# Patient Record
Sex: FEMALE | Race: WHITE | Employment: UNEMPLOYED | URBAN - METROPOLITAN AREA
[De-identification: names, ages, dates, MRNs, and addresses within clinical notes are randomized per-mention and may not be internally consistent; named-entity substitution may affect disease eponyms.]

---

## 2018-08-23 ENCOUNTER — APPOINTMENT (OUTPATIENT)
Dept: GENERAL RADIOLOGY | Age: 18
End: 2018-08-23
Attending: EMERGENCY MEDICINE
Payer: SUBSIDIZED

## 2018-08-23 ENCOUNTER — HOSPITAL ENCOUNTER (OUTPATIENT)
Age: 18
Setting detail: OBSERVATION
Discharge: HOME | End: 2018-08-24
Attending: EMERGENCY MEDICINE | Admitting: EMERGENCY MEDICINE
Payer: SUBSIDIZED

## 2018-08-23 DIAGNOSIS — J45.21 MILD INTERMITTENT ASTHMA WITH ACUTE EXACERBATION: Primary | ICD-10-CM

## 2018-08-23 PROBLEM — J45.901 ASTHMA EXACERBATION: Status: ACTIVE | Noted: 2018-08-23

## 2018-08-23 PROCEDURE — 96365 THER/PROPH/DIAG IV INF INIT: CPT

## 2018-08-23 PROCEDURE — 74011000250 HC RX REV CODE- 250: Performed by: EMERGENCY MEDICINE

## 2018-08-23 PROCEDURE — 74011250636 HC RX REV CODE- 250/636: Performed by: EMERGENCY MEDICINE

## 2018-08-23 PROCEDURE — 94640 AIRWAY INHALATION TREATMENT: CPT

## 2018-08-23 PROCEDURE — 99218 HC RM OBSERVATION: CPT

## 2018-08-23 PROCEDURE — 96375 TX/PRO/DX INJ NEW DRUG ADDON: CPT

## 2018-08-23 PROCEDURE — 71045 X-RAY EXAM CHEST 1 VIEW: CPT

## 2018-08-23 PROCEDURE — 77030029684 HC NEB SM VOL KT MONA -A

## 2018-08-23 PROCEDURE — 96366 THER/PROPH/DIAG IV INF ADDON: CPT

## 2018-08-23 PROCEDURE — 96361 HYDRATE IV INFUSION ADD-ON: CPT

## 2018-08-23 PROCEDURE — 99285 EMERGENCY DEPT VISIT HI MDM: CPT

## 2018-08-23 PROCEDURE — 74011250637 HC RX REV CODE- 250/637: Performed by: EMERGENCY MEDICINE

## 2018-08-23 RX ORDER — ALBUTEROL SULFATE 90 UG/1
2 AEROSOL, METERED RESPIRATORY (INHALATION)
Status: COMPLETED | OUTPATIENT
Start: 2018-08-23 | End: 2018-08-23

## 2018-08-23 RX ORDER — IPRATROPIUM BROMIDE AND ALBUTEROL SULFATE 2.5; .5 MG/3ML; MG/3ML
3 SOLUTION RESPIRATORY (INHALATION)
Status: DISCONTINUED | OUTPATIENT
Start: 2018-08-24 | End: 2018-08-24 | Stop reason: HOSPADM

## 2018-08-23 RX ORDER — SODIUM CHLORIDE 0.9 % (FLUSH) 0.9 %
5-10 SYRINGE (ML) INJECTION EVERY 8 HOURS
Status: DISCONTINUED | OUTPATIENT
Start: 2018-08-23 | End: 2018-08-24 | Stop reason: HOSPADM

## 2018-08-23 RX ORDER — IPRATROPIUM BROMIDE AND ALBUTEROL SULFATE 2.5; .5 MG/3ML; MG/3ML
3 SOLUTION RESPIRATORY (INHALATION)
Status: DISPENSED | OUTPATIENT
Start: 2018-08-23 | End: 2018-08-24

## 2018-08-23 RX ORDER — MAGNESIUM SULFATE HEPTAHYDRATE 40 MG/ML
2 INJECTION, SOLUTION INTRAVENOUS
Status: COMPLETED | OUTPATIENT
Start: 2018-08-23 | End: 2018-08-23

## 2018-08-23 RX ORDER — IPRATROPIUM BROMIDE AND ALBUTEROL SULFATE 2.5; .5 MG/3ML; MG/3ML
3 SOLUTION RESPIRATORY (INHALATION) ONCE
Status: COMPLETED | OUTPATIENT
Start: 2018-08-23 | End: 2018-08-23

## 2018-08-23 RX ORDER — SODIUM CHLORIDE 0.9 % (FLUSH) 0.9 %
10 SYRINGE (ML) INJECTION AS NEEDED
Status: DISCONTINUED | OUTPATIENT
Start: 2018-08-23 | End: 2018-08-24 | Stop reason: HOSPADM

## 2018-08-23 RX ORDER — IPRATROPIUM BROMIDE AND ALBUTEROL SULFATE 2.5; .5 MG/3ML; MG/3ML
3 SOLUTION RESPIRATORY (INHALATION)
Status: COMPLETED | OUTPATIENT
Start: 2018-08-23 | End: 2018-08-23

## 2018-08-23 RX ORDER — SODIUM CHLORIDE 0.9 % (FLUSH) 0.9 %
5-10 SYRINGE (ML) INJECTION AS NEEDED
Status: DISCONTINUED | OUTPATIENT
Start: 2018-08-23 | End: 2018-08-24 | Stop reason: SDUPTHER

## 2018-08-23 RX ORDER — HYDROCORTISONE SODIUM SUCCINATE 100 MG/2ML
INJECTION, POWDER, FOR SOLUTION INTRAMUSCULAR; INTRAVENOUS
Status: DISPENSED
Start: 2018-08-23 | End: 2018-08-24

## 2018-08-23 RX ORDER — PREDNISONE 20 MG/1
60 TABLET ORAL
Status: DISCONTINUED | OUTPATIENT
Start: 2018-08-24 | End: 2018-08-24

## 2018-08-23 RX ADMIN — MAGNESIUM SULFATE IN WATER 2 G: 40 INJECTION, SOLUTION INTRAVENOUS at 18:21

## 2018-08-23 RX ADMIN — IPRATROPIUM BROMIDE AND ALBUTEROL SULFATE 3 ML: .5; 3 SOLUTION RESPIRATORY (INHALATION) at 15:45

## 2018-08-23 RX ADMIN — IPRATROPIUM BROMIDE AND ALBUTEROL SULFATE 3 ML: .5; 3 SOLUTION RESPIRATORY (INHALATION) at 15:32

## 2018-08-23 RX ADMIN — IPRATROPIUM BROMIDE AND ALBUTEROL SULFATE 3 ML: .5; 3 SOLUTION RESPIRATORY (INHALATION) at 16:43

## 2018-08-23 RX ADMIN — METHYLPREDNISOLONE SODIUM SUCCINATE 125 MG: 125 INJECTION, POWDER, FOR SOLUTION INTRAMUSCULAR; INTRAVENOUS at 20:57

## 2018-08-23 RX ADMIN — ALBUTEROL SULFATE 2 PUFF: 90 AEROSOL, METERED RESPIRATORY (INHALATION) at 16:41

## 2018-08-23 RX ADMIN — SODIUM CHLORIDE 1000 ML: 900 INJECTION, SOLUTION INTRAVENOUS at 18:21

## 2018-08-23 RX ADMIN — IPRATROPIUM BROMIDE AND ALBUTEROL SULFATE 3 ML: .5; 3 SOLUTION RESPIRATORY (INHALATION) at 19:39

## 2018-08-23 NOTE — ED NOTES
Patient presents to the ED with c/o wheezing x2 days. Pt reports using a nebulizer at an urgent care and then took another one at home but still is wheezing. Pt reports the urgent care gave her prednisone. Pt reports taking mucinex. Pt reports chronic leg pain. Pt is alert and oriented. Pt skin is warm and dry. Pt is ambulatory independently. Emergency Department Nursing Plan of Care       The Nursing Plan of Care is developed from the Nursing assessment and Emergency Department Attending provider initial evaluation. The plan of care may be reviewed in the ED Provider note.     The Plan of Care was developed with the following considerations:   Patient / Family readiness to learn indicated by:verbalized understanding  Persons(s) to be included in education: patient  Barriers to Learning/Limitations:No    Signed     Jamal Coleman    8/23/2018   3:27 PM

## 2018-08-23 NOTE — ED NOTES
Bedside and Verbal shift change report given to MARLA Bello (oncoming nurse) by Neo Ponce RN (offgoing nurse). Report included the following information SBAR, ED Summary, MAR and Recent Results.

## 2018-08-23 NOTE — IP AVS SNAPSHOT
Summary of Care Report The Summary of Care report has been created to help improve care coordination. Users with access to [employer] or [address] (Web-based application) may access additional patient information including the Discharge Summary. If you are not currently a [address] user and need more information, please call the number listed below in the [place] [address] section and ask to be connected with Medical Records. Facility Information Name Address Phone [address] [place] [address] [phone] Patient Information Patient Name Sex  Leatha Campos ([id number]) Female 2000 Discharge Information Admitting Provider Service Area Unit Alie Corea MD / [address] [facility] / [phone] Discharge Provider Discharge Date/Time Discharge Disposition Destination (none) 2018 (Pending) AHR (none) Patient Language Language ENGLISH [13] Hospital Problems as of 2018  Reviewed: 2018 10:45 PM by Alie Corea MD  
  
  
  
 Class Noted - Resolved Last Modified POA Active Problems * (Principal)Asthma exacerbation  2018 - Present 2018 by Alie Corea MD Unknown Entered by Alie Corea MD  
  
Non-Hospital Problems as of 2018  Reviewed: 2018 10:45 PM by Alie Corea MD  
 None You are allergic to the following Allergen Reactions Sulfa (Sulfonamide Antibiotics) Hives Animal Dander Itching Sneezing Grass Pollen Itching Sneezing Nut - Unspecified Nausea and Vomiting Swelling All nuts except almonds Current Discharge Medication List  
  
START taking these medications Dose & Instructions Dispensing Information Comments  
 azithromycin 500 mg Tab Commonly known as:  Charlotta Primrose Dose:  500 mg Take 1 Tab by mouth daily for 2 days. Quantity:  2 Tab Refills:  0  
   
 montelukast 10 mg tablet Commonly known as:  SINGULAIR Dose:  10 mg Take 1 Tab by mouth nightly. Quantity:  30 Tab Refills:  0 CONTINUE these medications which have NOT CHANGED Dose & Instructions Dispensing Information Comments CLARITIN 10 mg tablet Generic drug:  loratadine Dose:  10 mg Take 10 mg by mouth daily. Refills:  0  
   
 diphenhydrAMINE 25 mg tablet Commonly known as:  BENADRYL Dose:  12.5-50 mg Take 12.5-50 mg by mouth nightly as needed for Sleep (allergies). Refills:  0  
   
 metroNIDAZOLE 500 mg tablet Commonly known as:  FLAGYL Dose:  500 mg Take 500 mg by mouth every twelve (12) hours. For 7 days starting 8/20/18 Refills:  0  
   
 predniSONE 10 mg dose pack Commonly known as:  STERAPRED DS Take  by mouth See Admin Instructions. See administration instruction per 10mg dose pack Refills:  0  
   
 SYMBICORT IN Dose:  1 Puff Take 1 Puff by inhalation two (2) times a day. Refills:  0  
   
 * VENTOLIN HFA 90 mcg/actuation inhaler Generic drug:  albuterol Dose:  2 Puff Take 2 Puffs by inhalation every four (4) hours as needed for Wheezing or Shortness of Breath. Indications: Acute Asthma Attack Refills:  0  
   
 * albuterol 2.5 mg /3 mL (0.083 %) nebulizer solution Commonly known as:  PROVENTIL VENTOLIN Dose:  2.5 mg  
2.5 mg by Nebulization route every four (4) hours as needed for Wheezing or Shortness of Breath (if no relief from albuterol inhaler). Indications: Acute Asthma Attack Refills:  0  
   
 * Notice: This list has 2 medication(s) that are the same as other medications prescribed for you. Read the directions carefully, and ask your doctor or other care provider to review them with you. Follow-up Information Follow up With Details Comments Contact Info Your PCP in [address] an appointment as soon as possible for a visit [facility] Schedule an appointment as soon as possible for a visit As needed if you need a local PCP [address] [address] [phone] [facility] EMERGENCY DEPT  As needed, If symptoms worsen [address] [facility],[phone] [facility] None   None (395) Patient stated that they have no PCP Discharge Instructions Learning About Asthma Triggers What are asthma triggers? When you have asthma, certain things can make your symptoms worse. These are called triggers. Learn what triggers an asthma attack for you, and avoid the triggers when you can. Common triggers include colds, smoke, air pollution, dust, pollen, pets, stress, and cold air. How do asthma triggers affect you? Triggers can make it harder for your lungs to work as they should. They can lead to sudden breathing problems and other symptoms. When you are around a trigger, an asthma attack is more likely. If your symptoms are severe, you may need emergency treatment or have to go to the hospital for treatment. What can you do to avoid triggers? The first thing is to know your triggers. When you are having symptoms, note the things around you that might be causing them. Then look for patterns that may be triggering your symptoms. Record your triggers on a piece of paper or in an asthma diary. When you have your list of possible triggers, work with your doctor to find ways to avoid them. Avoid colds and flu. Get a pneumococcal vaccine shot. If you have had one before, ask your doctor whether you need a second dose. Get a flu vaccine every year, as soon as it's available. If you must be around people with colds or the flu, wash your hands often. Here are some ways to avoid a few common triggers. · Do not smoke or allow others to smoke around you. If you need help quitting, talk to your doctor about stop-smoking programs and medicines. These can increase your chances of quitting for good. · If there is a lot of pollution, pollen, or dust outside, stay at home and keep your windows closed. Use an air conditioner or air filter in your home. Check your local weather report or newspaper for air quality and pollen reports. What else should you know? · Take your controller medicine every day, not just when you have symptoms. It helps prevent problems before they occur. · Your doctor may suggest that you check how well your lungs are working by measuring your peak expiratory flow (PEF) throughout the day. Your PEF may drop when you are near things that trigger symptoms. Where can you learn more? Go to http://arnavShopSpotmay.info/. Enter Q228 in the search box to learn more about \"Learning About Asthma Triggers. \" Current as of: December 6, 2017 Content Version: 11.7 © 3979-0646 [employer]. Care instructions adapted under license by [employer] (which disclaims liability or warranty for this information). If you have questions about a medical condition or this instruction, always ask your healthcare professional. [place] [address] any warranty or liability for your use of this information. Asthma in Adults: Care Instructions Your Care Instructions During an asthma attack, your airways swell and narrow as a reaction to certain things (triggers). This makes it hard to breathe. You may be able to prevent asthma attacks if you avoid the things that set off your asthma symptoms. Keeping your asthma under control and treating symptoms before they get bad can help you avoid severe attacks. If you can control your asthma, you may be able to do all of your normal daily activities.  You may also avoid asthma attacks and trips to the hospital. 
 Follow-up care is a key part of your treatment and safety. Be sure to make and go to all appointments, and call your doctor if you are having problems. It's also a good idea to know your test results and keep a list of the medicines you take. How can you care for yourself at home? · Follow your asthma action plan so you can manage your symptoms at home. An asthma action plan will help you prevent and control airway reactions and will tell you what to do during an asthma attack. If you do not have an asthma action plan, work with your doctor to build one. · Take your asthma medicine exactly as prescribed. Medicine plays an important role in controlling asthma. Talk to your doctor right away if you have any questions about what to take and how to take it. ¨ Use your quick-relief medicine when you have symptoms of an attack. Quick-relief medicine often is an albuterol inhaler. Some people need to use quick-relief medicine before they exercise. ¨ Take your controller medicine every day, not just when you have symptoms. Controller medicine is usually an inhaled corticosteroid. The goal is to prevent problems before they occur. Do not use your controller medicine to try to treat an attack that has already started. It does not work fast enough to help. ¨ If your doctor prescribed corticosteroid pills to use during an attack, take them as directed. They may take hours to work, but they may shorten the attack and help you breathe better. ¨ Keep your quick-relief medicine with you at all times. · Talk to your doctor before using other medicines. Some medicines, such as aspirin, can cause asthma attacks in some people. · Check yourself for asthma symptoms to know which step to follow in your action plan. Watch for things like being short of breath, having chest tightness, coughing, and wheezing. Also notice if symptoms wake you up at night or if you get tired quickly when you exercise. · If you have a peak flow meter, use it to check how well you are breathing. This can help you predict when an asthma attack is going to occur. Then you can take medicine to prevent the asthma attack or make it less severe. · See your doctor regularly. These visits will help you learn more about asthma and what you can do to control it. Your doctor will monitor your treatment to make sure the medicine is helping you. · Keep track of your asthma attacks and your treatment. After you have had an attack, write down what triggered it, what helped end it, and any concerns you have about your asthma action plan. Take your diary when you see your doctor. You can then review your asthma action plan and decide if it is working. · Do not smoke or allow others to smoke around you. Avoid smoky places. Smoking makes asthma worse. If you need help quitting, talk to your doctor about stop-smoking programs and medicines. These can increase your chances of quitting for good. · Learn what triggers an asthma attack for you, and avoid the triggers when you can. Common triggers include colds, smoke, air pollution, dust, pollen, mold, pets, cockroaches, stress, and cold air. · Avoid colds and the flu. Get a pneumococcal vaccine shot. If you have had one before, ask your doctor whether you need a second dose. Get a flu vaccine every fall. If you must be around people with colds or the flu, wash your hands often. When should you call for help? Call 911 anytime you think you may need emergency care. For example, call if: 
  · You have severe trouble breathing.  
 Call your doctor now or seek immediate medical care if: 
  · Your symptoms do not get better after you have followed your asthma action plan.  
  · You cough up yellow, dark brown, or bloody mucus (sputum).  
 Watch closely for changes in your health, and be sure to contact your doctor if: 
  · Your coughing and wheezing get worse.   · You need to use quick-relief medicine on more than 2 days a week (unless it is just for exercise).  
  · You need help figuring out what is triggering your asthma attacks. Where can you learn more? Go to http://arnav-may.info/. Enter P597 in the search box to learn more about \"Asthma in Adults: Care Instructions. \" Current as of: December 6, 2017 Content Version: 11.7 © 7035-3379 [employer]. Care instructions adapted under license by [employer] (which disclaims liability or warranty for this information). If you have questions about a medical condition or this instruction, always ask your healthcare professional. [place] [address] any warranty or liability for your use of this information. Chart Review Routing History No Routing History on File

## 2018-08-23 NOTE — ED NOTES
Patient reports feeling better, but when she walks to the bathroom she is still wheezing and feels out of breath.

## 2018-08-23 NOTE — IP AVS SNAPSHOT
[address] [facility] 
 
 
 [address] Patient: Jacqui Allison MRN: [id number] FPN:[id number] About your hospitalization You were admitted on:  August 23, 2018 You last received care in the:  [address] You were discharged on:  August 24, 2018 Why you were hospitalized Your primary diagnosis was:  Asthma Exacerbation Follow-up Information Follow up With Details Comments Contact Info Your PCP in [address] an appointment as soon as possible for a visit [facility] Schedule an appointment as soon as possible for a visit As needed if you need a local PCP [address] [facility] [address] [phone] [facility] EMERGENCY DEPT  As needed, If symptoms worsen [address] [facility], [address] None   None ([phone]) Patient stated that they have no PCP Discharge Orders None A check deonte indicates which time of day the medication should be taken. My Medications START taking these medications Instructions Each Dose to Equal  
 Morning Noon Evening Bedtime  
 azithromycin 500 mg Tab Commonly known as:  Bina Ax Your last dose was: Your next dose is: Take 1 Tab by mouth daily for 2 days. 500 mg  
    
   
   
   
  
 montelukast 10 mg tablet Commonly known as:  SINGULAIR Your last dose was: Your next dose is: Take 1 Tab by mouth nightly. 10 mg CONTINUE taking these medications Instructions Each Dose to Equal  
 Morning Noon Evening Bedtime CLARITIN 10 mg tablet Generic drug:  loratadine Your last dose was: Your next dose is: Take 10 mg by mouth daily. 10 mg  
    
   
   
   
  
 diphenhydrAMINE 25 mg tablet Commonly known as:  BENADRYL Your last dose was: Your next dose is: Take 12.5-50 mg by mouth nightly as needed for Sleep (allergies). 12.5-50 mg  
    
   
   
   
  
 metroNIDAZOLE 500 mg tablet Commonly known as:  FLAGYL Your last dose was: Your next dose is: Take 500 mg by mouth every twelve (12) hours. For 7 days starting 8/20/18  
 500 mg  
    
   
   
   
  
 predniSONE 10 mg dose pack Commonly known as:  STERAPRED DS Your last dose was: Your next dose is: Take  by mouth See Admin Instructions. See administration instruction per 10mg dose pack SYMBICORT IN Your last dose was: Your next dose is: Take 1 Puff by inhalation two (2) times a day. 1 Puff * VENTOLIN HFA 90 mcg/actuation inhaler Generic drug:  albuterol Your last dose was: Your next dose is: Take 2 Puffs by inhalation every four (4) hours as needed for Wheezing or Shortness of Breath. Indications: Acute Asthma Attack 2 Puff * albuterol 2.5 mg /3 mL (0.083 %) nebulizer solution Commonly known as:  PROVENTIL VENTOLIN Your last dose was: Your next dose is:    
   
   
 2.5 mg by Nebulization route every four (4) hours as needed for Wheezing or Shortness of Breath (if no relief from albuterol inhaler). Indications: Acute Asthma Attack 2.5 mg  
    
   
   
   
  
 * Notice: This list has 2 medication(s) that are the same as other medications prescribed for you. Read the directions carefully, and ask your doctor or other care provider to review them with you. Where to Get Your Medications These medications were sent to [email or web address] [employer], [address]  [address] [place] [address], [address] [place] [address] Phone:  [phone]  
  azithromycin 500 mg Tab  
 montelukast 10 mg tablet Discharge Instructions Learning About Asthma Triggers What are asthma triggers? When you have asthma, certain things can make your symptoms worse. These are called triggers. Learn what triggers an asthma attack for you, and avoid the triggers when you can. Common triggers include colds, smoke, air pollution, dust, pollen, pets, stress, and cold air. How do asthma triggers affect you? Triggers can make it harder for your lungs to work as they should. They can lead to sudden breathing problems and other symptoms. When you are around a trigger, an asthma attack is more likely. If your symptoms are severe, you may need emergency treatment or have to go to the hospital for treatment. What can you do to avoid triggers? The first thing is to know your triggers. When you are having symptoms, note the things around you that might be causing them. Then look for patterns that may be triggering your symptoms. Record your triggers on a piece of paper or in an asthma diary. When you have your list of possible triggers, work with your doctor to find ways to avoid them. Avoid colds and flu. Get a pneumococcal vaccine shot. If you have had one before, ask your doctor whether you need a second dose. Get a flu vaccine every year, as soon as it's available. If you must be around people with colds or the flu, wash your hands often. Here are some ways to avoid a few common triggers. · Do not smoke or allow others to smoke around you. If you need help quitting, talk to your doctor about stop-smoking programs and medicines. These can increase your chances of quitting for good. · If there is a lot of pollution, pollen, or dust outside, stay at home and keep your windows closed. Use an air conditioner or air filter in your home. Check your local weather report or newspaper for air quality and pollen reports. What else should you know? · Take your controller medicine every day, not just when you have symptoms. It helps prevent problems before they occur. · Your doctor may suggest that you check how well your lungs are working by measuring your peak expiratory flow (PEF) throughout the day. Your PEF may drop when you are near things that trigger symptoms. Where can you learn more? Go to http://arnav-may.info/. Enter O070 in the search box to learn more about \"Learning About Asthma Triggers. \" Current as of: December 6, 2017 Content Version: 11.7 © 0491-5547 [email or web address]. Care instructions adapted under license by [employer] (which disclaims liability or warranty for this information). If you have questions about a medical condition or this instruction, always ask your healthcare professional. [address] any warranty or liability for your use of this information. Asthma in Adults: Care Instructions Your Care Instructions During an asthma attack, your airways swell and narrow as a reaction to certain things (triggers). This makes it hard to breathe. You may be able to prevent asthma attacks if you avoid the things that set off your asthma symptoms. Keeping your asthma under control and treating symptoms before they get bad can help you avoid severe attacks. If you can control your asthma, you may be able to do all of your normal daily activities. You may also avoid asthma attacks and trips to the hospital. 
Follow-up care is a key part of your treatment and safety. Be sure to make and go to all appointments, and call your doctor if you are having problems. It's also a good idea to know your test results and keep a list of the medicines you take. How can you care for yourself at home? · Follow your asthma action plan so you can manage your symptoms at home. An asthma action plan will help you prevent and control airway reactions and will tell you what to do during an asthma attack. If you do not have an asthma action plan, work with your doctor to build one. · Take your asthma medicine exactly as prescribed. Medicine plays an important role in controlling asthma. Talk to your doctor right away if you have any questions about what to take and how to take it. ¨ Use your quick-relief medicine when you have symptoms of an attack. Quick-relief medicine often is an albuterol inhaler. Some people need to use quick-relief medicine before they exercise. ¨ Take your controller medicine every day, not just when you have symptoms. Controller medicine is usually an inhaled corticosteroid. The goal is to prevent problems before they occur. Do not use your controller medicine to try to treat an attack that has already started. It does not work fast enough to help. ¨ If your doctor prescribed corticosteroid pills to use during an attack, take them as directed. They may take hours to work, but they may shorten the attack and help you breathe better. ¨ Keep your quick-relief medicine with you at all times. · Talk to your doctor before using other medicines. Some medicines, such as aspirin, can cause asthma attacks in some people. · Check yourself for asthma symptoms to know which step to follow in your action plan. Watch for things like being short of breath, having chest tightness, coughing, and wheezing. Also notice if symptoms wake you up at night or if you get tired quickly when you exercise. · If you have a peak flow meter, use it to check how well you are breathing. This can help you predict when an asthma attack is going to occur. Then you can take medicine to prevent the asthma attack or make it less severe. · See your doctor regularly. These visits will help you learn more about asthma and what you can do to control it. Your doctor will monitor your treatment to make sure the medicine is helping you. · Keep track of your asthma attacks and your treatment.  After you have had an attack, write down what triggered it, what helped end it, and any concerns you have about your asthma action plan. Take your diary when you see your doctor. You can then review your asthma action plan and decide if it is working. · Do not smoke or allow others to smoke around you. Avoid smoky places. Smoking makes asthma worse. If you need help quitting, talk to your doctor about stop-smoking programs and medicines. These can increase your chances of quitting for good. · Learn what triggers an asthma attack for you, and avoid the triggers when you can. Common triggers include colds, smoke, air pollution, dust, pollen, mold, pets, cockroaches, stress, and cold air. · Avoid colds and the flu. Get a pneumococcal vaccine shot. If you have had one before, ask your doctor whether you need a second dose. Get a flu vaccine every fall. If you must be around people with colds or the flu, wash your hands often. When should you call for help? Call 911 anytime you think you may need emergency care. For example, call if: 
  · You have severe trouble breathing.  
 Call your doctor now or seek immediate medical care if: 
  · Your symptoms do not get better after you have followed your asthma action plan.  
  · You cough up yellow, dark brown, or bloody mucus (sputum).  
 Watch closely for changes in your health, and be sure to contact your doctor if: 
  · Your coughing and wheezing get worse.  
  · You need to use quick-relief medicine on more than 2 days a week (unless it is just for exercise).  
  · You need help figuring out what is triggering your asthma attacks. Where can you learn more? Go to http://arnav-may.info/. Enter P597 in the search box to learn more about \"Asthma in Adults: Care Instructions. \" Current as of: December 6, 2017 Content Version: 11.7 © 1783-5579 [employer].  Care instructions adapted under license by [employer] (which disclaims liability or warranty for this information). If you have questions about a medical condition or this instruction, always ask your healthcare professional. [address] any warranty or liability for your use of this information. Introducing [facility] SERVICES! Mackelin Jonesrobin introduces MycooN patient portal. Now you can access parts of your medical record, email your doctor's office, and request medication refills online. 1. In your internet browser, go to [email or web address] 2. Click on the First Time User? Click Here link in the Sign In box. You will see the New Member Sign Up page. 3. Enter your MycooN Access Code exactly as it appears below. You will not need to use this code after youve completed the sign-up process. If you do not sign up before the expiration date, you must request a new code. · MycooN Access Code: [email or web address] Expires: 11/21/2018  3:13 PM 
 
4. Enter the last four digits of your Social Security Number (xxxx) and Date of Birth (mm/dd/yyyy) as indicated and click Submit. You will be taken to the next sign-up page. 5. Create a MycooN ID. This will be your MycooN login ID and cannot be changed, so think of one that is secure and easy to remember. 6. Create a MycooN password. You can change your password at any time. 7. Enter your Password Reset Question and Answer. This can be used at a later time if you forget your password. 8. Enter your e-mail address. You will receive e-mail notification when new information is available in [address] 9. Click Sign Up. You can now view and download portions of your medical record. 10. Click the Download Summary menu link to download a portable copy of your medical information. If you have questions, please visit the Frequently Asked Questions section of the MycooN website. Remember, MycooN is NOT to be used for urgent needs. For medical emergencies, dial 911. Now available from your iPhone and Android! Introducing Darien Jones As a [employer] patient, I wanted to make you aware of our electronic visit tool called Darien Jones. [employer] 24/7 allows you to connect within minutes with a medical provider 24 hours a day, seven days a week via a mobile device or tablet or logging into a secure website from your computer. You can access Darien oJnes from anywhere in the [place]. A virtual visit might be right for you when you have a simple condition and feel like you just dont want to get out of bed, or cant get away from work for an appointment, when your regular [employer] provider is not available (evenings, weekends or holidays), or when youre out of town and need minor care. Electronic visits cost only $49 and if the [employer] 24/7 provider determines a prescription is needed to treat your condition, one can be electronically transmitted to a nearby pharmacy*. Please take a moment to enroll today if you have not already done so. The enrollment process is free and takes just a few minutes. To enroll, please download the [employer] 24/7 anish to your tablet or phone, or visit [email or web address] to enroll on your computer. And, as an [address] [place] [address] patient with a [facility] account, the results of your visits will be scanned into your electronic medical record and your primary care provider will be able to view the scanned results. We urge you to continue to see your regular [employer] provider for your ongoing medical care. And while your primary care provider may not be the one available when you seek a Darien Jones virtual visit, the peace of mind you get from getting a real diagnosis real time can be priceless. For more information on Darien Jones, view our Frequently Asked Questions (FAQs) at [email or web address]. Sincerely, 
 
Pal Ruiz MD 
Chief Medical Officer [employer] *:  certain medications cannot be prescribed via Darien Jones Providers Seen During Your Hospitalization Provider Specialty Primary office phone Jordon Arevalo MD Emergency Medicine [phone] Nima Rios MD Emergency Medicine [phone] Sergio Frankel MD Internal Medicine [phone] Your Primary Care Physician (PCP) Primary Care Physician Office Phone Office Fax NONE ** None ** ** None ** You are allergic to the following Allergen Reactions Sulfa (Sulfonamide Antibiotics) Hives Animal Dander Itching Sneezing Grass Pollen Itching Sneezing Nut - Unspecified Nausea and Vomiting Swelling All nuts except almonds Recent Documentation Height Weight BMI Smoking Status 1.473 m (<1 %, Z= -2.46)* 52.1 kg (24 %, Z= -0.72)* 23.99 kg/m2 (72 %, Z= 0.59)* Never Smoker *Growth percentiles are based on [facility] 2-20 Years data. Emergency Contacts Name Discharge Info Relation Home Work Mobile Gracia Carrion [1] Mother [14] [phone] Patient Belongings The following personal items are in your possession at time of discharge: 
  Dental Appliances: Retainer(s)  Visual Aid: None      Home Medications: Kept at bedside   Jewelry: Earrings, With patient  Clothing: Pants, Shirt, Socks    Other Valuables: None Please provide this summary of care documentation to your next provider. Signatures-by signing, you are acknowledging that this After Visit Summary has been reviewed with you and you have received a copy. Patient Signature:  ____________________________________________________________ Date:  ____________________________________________________________  
  
Angela Li Provider Signature:  ____________________________________________________________ Date:  ____________________________________________________________

## 2018-08-23 NOTE — ED PROVIDER NOTES
EMERGENCY DEPARTMENT HISTORY AND PHYSICAL EXAM      Date: 8/23/2018  Patient Name: Deshawn Strong    History of Presenting Illness     Chief Complaint   Patient presents with    Wheezing     pt c/o sob,asthma seen at urgent care with same had neb tx and 60 mg prednisone x 4 hours ago. History Provided By: Patient    HPI: Deshawn Strong, 21 y.o. female with PMHx significant for asthma, presents ambulatory to the ED with cc of persistent SOB and wheezing x 2 days. Pt also reports cough, chills, and headache. She states she has done numerous nebulizer treatments (over 12) at home over the past 18 hours with no relief of sx's. Pt was seen at Urgent Care where she received another treatment and 60mg prednisone, however was told that if she could not wait another 4 hours for a breathing treatment to go to the ED. She explains upon returning home she did a breathing treatment again with no relief, prompting her ED visit. Pt reports current sx's are c/w hx of asthma, however notes she typically responds more promptly to prednisone and breathing treatments. Pt is not currently on OCP's and denies tobacco use. Of note, pt states she is currently taking Flagyl for BV. She explains she has plenty of Albuterol at home and does not need a refill. She is currently on her menstrual cycle and denies chance of pregnancy. Pt specifically denies any fever, leg swelling, or calf pain. There are no other complaints, changes, or physical findings at this time.     PCP: None    Current Facility-Administered Medications   Medication Dose Route Frequency Provider Last Rate Last Dose    albuterol-ipratropium (DUO-NEB) 2.5 MG-0.5 MG/3 ML  3 mL Nebulization NOW Quinten Corbett MD        sodium chloride (NS) flush 10 mL  10 mL IntraVENous PRN Quinten Corbett MD        hydrocortisone Sod Succ (PF) (SOLU-CORTEF) 100 mg/2 mL injection             sodium chloride (NS) flush 5-10 mL  5-10 mL IntraVENous Q8H Jacobo Fraser MD        sodium chloride (NS) flush 5-10 mL  5-10 mL IntraVENous PRN Dennis Clark MD        [START ON 8/24/2018] predniSONE (DELTASONE) tablet 60 mg  60 mg Oral DAILY WITH BREAKFAST Dennis Clark MD        [START ON 8/24/2018] albuterol-ipratropium (DUO-NEB) 2.5 MG-0.5 MG/3 ML  3 mL Nebulization Q4H RT Dennis Clark MD         Current Outpatient Prescriptions   Medication Sig Dispense Refill    budesonide/formoterol fumarate (SYMBICORT IN) Take  by inhalation.  loratadine (CLARITIN PO) Take  by mouth. Past History     Past Medical History:  History reviewed. No pertinent past medical history. Past Surgical History:  Past Surgical History:   Procedure Laterality Date    HX WISDOM TEETH EXTRACTION         Family History:  History reviewed. No pertinent family history. Social History:  Social History   Substance Use Topics    Smoking status: Never Smoker    Smokeless tobacco: Never Used    Alcohol use No       Allergies: Allergies   Allergen Reactions    Sulfa (Sulfonamide Antibiotics) Hives         Review of Systems   Review of Systems   Constitutional: Positive for chills. Negative for fever. HENT: Negative for congestion, rhinorrhea, sneezing and sore throat. Respiratory: Positive for cough, shortness of breath and wheezing. Cardiovascular: Negative for chest pain and leg swelling. Gastrointestinal: Negative for abdominal pain, nausea and vomiting. Musculoskeletal: Negative for back pain, myalgias and neck stiffness.        - calf pain   Skin: Negative for rash. Neurological: Positive for headaches. Negative for dizziness and weakness. All other systems reviewed and are negative. Physical Exam   Physical Exam   Constitutional: She is oriented to person, place, and time. She appears well-developed and well-nourished. HENT:   Head: Normocephalic and atraumatic.    Mouth/Throat: Oropharynx is clear and moist.   Eyes: Conjunctivae and EOM are normal. Neck: Normal range of motion and full passive range of motion without pain. Neck supple. Cardiovascular: Regular rhythm, S1 normal, S2 normal, normal heart sounds, intact distal pulses and normal pulses. Tachycardia present. No murmur heard. Pulmonary/Chest: Tachypnea noted. No respiratory distress. Speaking in 5-6 word sentences. Diffuse end expiratory wheezing. Abdominal: Soft. Normal appearance and bowel sounds are normal. She exhibits no distension. There is no tenderness. There is no rebound. Musculoskeletal: Normal range of motion. Neurological: She is alert and oriented to person, place, and time. She has normal strength. Skin: Skin is warm, dry and intact. No rash noted. Psychiatric: She has a normal mood and affect. Her speech is normal and behavior is normal. Judgment and thought content normal.   Nursing note and vitals reviewed. Medical Decision Making   I am the first provider for this patient. I reviewed the vital signs, available nursing notes, past medical history, past surgical history, family history and social history. Vital Signs-Reviewed the patient's vital signs. Patient Vitals for the past 12 hrs:   Temp Pulse Resp BP SpO2   08/23/18 2247 98.5 °F (36.9 °C) (!) 126 22 127/73 97 %   08/23/18 2139 - (!) 133 21 - 97 %   08/23/18 2021 - - 22 - 97 %   08/23/18 2000 - (!) 143 21 114/59 99 %   08/23/18 1935 - - - - 96 %   08/23/18 1928 - - - 113/77 -   08/23/18 1814 98.8 °F (37.1 °C) (!) 157 18 116/62 98 %   08/23/18 1643 - - - - 100 %   08/23/18 1548 - - - - 100 %   08/23/18 1534 - - - - 100 %   08/23/18 1517 98.1 °F (36.7 °C) (!) 154 26 105/82 94 %       Pulse Oximetry Analysis - 94% on RA    Cardiac Monitor:   Rate: 154 bpm  Rhythm: Sinus Tachycardia      Records Reviewed: Nursing Notes and Old Medical Records    Provider Notes (Medical Decision Making):   DDx; RAD, asthma exacerbation, low concern for PNA or PE    ED Course:   Initial assessment performed.  The patients presenting problems have been discussed, and they are in agreement with the care plan formulated and outlined with them. I have encouraged them to ask questions as they arise throughout their visit. 4:05 PM  I re-examined the patient and evaluated the effectiveness of breathing treatment they received. I feel that there has been some improvement, but also feel that the patient would benefit clinically from further breathing treatments. I will evaluate the patient again after the next round of treatments. 4:33 PM  Pt remains wheezy and tachycardic, however she refuses IV or fluids. Pt does state her breathing is improving. Will order an additional duoneb. Written by Oneal Abbott ED scribe, as dictated by Rudy Tai MD    6:12 PM  With ambulation pt still gets short of breath and wheezes, however states her breathing is less labored. She remains tachycardic and has agreed to an IV and 1L NS as well as Magnesium IV 2gm. Written by Oneal Abbott ED scribtyler, as dictated by Rudy Tai MD    SIGN OUT:  6:59 PM  Patient's presentation, labs/imaging and plan of care was reviewed with Kary Marino MD as part of sign out. They will continue to monitor pt's progress as part of the plan discussed with the patient. Kary Marino MD's assistance in completion of this plan is greatly appreciated but it should be noted that I will be the provider of record for this patient. Rudy Tai MD    10:08 PM  HR still elevated and still SOB at rest. Pt does not feel like she is well enough to go home. Will discuss with [facility] for admission. CONSULT NOTE:   10:30 PM  Sharath Renae MD spoke with Dr. Nathanael Bowles,   Specialty: Hospitalist  Discussed pt's hx, disposition, and available diagnostic and imaging results. Reviewed care plans. Consultant will evaluate pt for admission. Written by David Isabel ED Scrnelli, as dictated by Katharina Sam, MD.    Critical Care Time:  CRITICAL CARE NOTE :    10:31 PM    IMPENDING DETERIORATION -Airway, Respiratory and Cardiovascular    ASSOCIATED RISK FACTORS - Hypoxia, Dysrhythmia, Metabolic changes and Dehydration    MANAGEMENT- Bedside Assessment and Supervision of Care    INTERPRETATION -  Xrays    INTERVENTIONS - Metobolic interventions    CASE REVIEW - Hospitalist and Nursing    TREATMENT RESPONSE -Unchanged     PERFORMED BY - Self    NOTES   :    I have spent 65 minutes of critical care time involved in lab review, consultations with specialist, family decision- making, bedside attention and documentation. During this entire length of time I was immediately available to the patient. Critical Care: The reason for providing this level of medical care for this critically ill patient was due to a critical illness that impaired one or more vital organ systems such that there was a high probability of imminent or life threatening deterioration in the patients condition. This care involved high complexity decision making to assess, manipulate, and support vital system functions. Basilio Baxter MD      Disposition:  PLAN: Admit to Hospitalist    10:30 PM  Patient is being admitted to the hospital by Dr. Saranya Constantino. The results of their tests and reasons for their admission have been discussed with them and/or available family. They convey agreement and understanding for the need to be admitted and for their admission diagnosis. Written by GIOVANNI Ashibtyler, as dictated by Carlota Holstein Fracisco Martínez MD.      Diagnosis     Clinical Impression:   1. Mild intermittent asthma with acute exacerbation        Attestations: This note is prepared by Mac Sierra, acting as Scribe for Anahi Pitts MD.    Anahi Pitts MD: The scribe's documentation has been prepared under my direction and personally reviewed by me in its entirety.  I confirm that the note above accurately reflects all work, treatment, procedures, and medical decision making performed by me. This note will not be viewable in [address]

## 2018-08-23 NOTE — DISCHARGE INSTRUCTIONS
Learning About Asthma Triggers  What are asthma triggers? When you have asthma, certain things can make your symptoms worse. These are called triggers. Learn what triggers an asthma attack for you, and avoid the triggers when you can. Common triggers include colds, smoke, air pollution, dust, pollen, pets, stress, and cold air. How do asthma triggers affect you? Triggers can make it harder for your lungs to work as they should. They can lead to sudden breathing problems and other symptoms. When you are around a trigger, an asthma attack is more likely. If your symptoms are severe, you may need emergency treatment or have to go to the hospital for treatment. What can you do to avoid triggers? The first thing is to know your triggers. When you are having symptoms, note the things around you that might be causing them. Then look for patterns that may be triggering your symptoms. Record your triggers on a piece of paper or in an asthma diary. When you have your list of possible triggers, work with your doctor to find ways to avoid them. Avoid colds and flu. Get a pneumococcal vaccine shot. If you have had one before, ask your doctor whether you need a second dose. Get a flu vaccine every year, as soon as it's available. If you must be around people with colds or the flu, wash your hands often. Here are some ways to avoid a few common triggers. · Do not smoke or allow others to smoke around you. If you need help quitting, talk to your doctor about stop-smoking programs and medicines. These can increase your chances of quitting for good. · If there is a lot of pollution, pollen, or dust outside, stay at home and keep your windows closed. Use an air conditioner or air filter in your home. Check your local weather report or newspaper for air quality and pollen reports. What else should you know? · Take your controller medicine every day, not just when you have symptoms.  It helps prevent problems before they occur. · Your doctor may suggest that you check how well your lungs are working by measuring your peak expiratory flow (PEF) throughout the day. Your PEF may drop when you are near things that trigger symptoms. Where can you learn more? Go to http://arnav-may.info/. Enter K967 in the search box to learn more about \"Learning About Asthma Triggers. \"  Current as of: December 6, 2017  Content Version: 11.7  © 3113-7178 [employer]. Care instructions adapted under license by [employer] (which disclaims liability or warranty for this information). If you have questions about a medical condition or this instruction, always ask your healthcare professional. [address] any warranty or liability for your use of this information. Asthma in Adults: Care Instructions  Your Care Instructions    During an asthma attack, your airways swell and narrow as a reaction to certain things (triggers). This makes it hard to breathe. You may be able to prevent asthma attacks if you avoid the things that set off your asthma symptoms. Keeping your asthma under control and treating symptoms before they get bad can help you avoid severe attacks. If you can control your asthma, you may be able to do all of your normal daily activities. You may also avoid asthma attacks and trips to the hospital.  Follow-up care is a key part of your treatment and safety. Be sure to make and go to all appointments, and call your doctor if you are having problems. It's also a good idea to know your test results and keep a list of the medicines you take. How can you care for yourself at home? · Follow your asthma action plan so you can manage your symptoms at home. An asthma action plan will help you prevent and control airway reactions and will tell you what to do during an asthma attack. If you do not have an asthma action plan, work with your doctor to build one.   · Take your asthma medicine exactly as prescribed. Medicine plays an important role in controlling asthma. Talk to your doctor right away if you have any questions about what to take and how to take it. ¨ Use your quick-relief medicine when you have symptoms of an attack. Quick-relief medicine often is an albuterol inhaler. Some people need to use quick-relief medicine before they exercise. ¨ Take your controller medicine every day, not just when you have symptoms. Controller medicine is usually an inhaled corticosteroid. The goal is to prevent problems before they occur. Do not use your controller medicine to try to treat an attack that has already started. It does not work fast enough to help. ¨ If your doctor prescribed corticosteroid pills to use during an attack, take them as directed. They may take hours to work, but they may shorten the attack and help you breathe better. ¨ Keep your quick-relief medicine with you at all times. · Talk to your doctor before using other medicines. Some medicines, such as aspirin, can cause asthma attacks in some people. · Check yourself for asthma symptoms to know which step to follow in your action plan. Watch for things like being short of breath, having chest tightness, coughing, and wheezing. Also notice if symptoms wake you up at night or if you get tired quickly when you exercise. · If you have a peak flow meter, use it to check how well you are breathing. This can help you predict when an asthma attack is going to occur. Then you can take medicine to prevent the asthma attack or make it less severe. · See your doctor regularly. These visits will help you learn more about asthma and what you can do to control it. Your doctor will monitor your treatment to make sure the medicine is helping you. · Keep track of your asthma attacks and your treatment.  After you have had an attack, write down what triggered it, what helped end it, and any concerns you have about your asthma action plan. Take your diary when you see your doctor. You can then review your asthma action plan and decide if it is working. · Do not smoke or allow others to smoke around you. Avoid smoky places. Smoking makes asthma worse. If you need help quitting, talk to your doctor about stop-smoking programs and medicines. These can increase your chances of quitting for good. · Learn what triggers an asthma attack for you, and avoid the triggers when you can. Common triggers include colds, smoke, air pollution, dust, pollen, mold, pets, cockroaches, stress, and cold air. · Avoid colds and the flu. Get a pneumococcal vaccine shot. If you have had one before, ask your doctor whether you need a second dose. Get a flu vaccine every fall. If you must be around people with colds or the flu, wash your hands often. When should you call for help? Call 911 anytime you think you may need emergency care. For example, call if:    · You have severe trouble breathing.    Call your doctor now or seek immediate medical care if:    · Your symptoms do not get better after you have followed your asthma action plan.     · You cough up yellow, dark brown, or bloody mucus (sputum).    Watch closely for changes in your health, and be sure to contact your doctor if:    · Your coughing and wheezing get worse.     · You need to use quick-relief medicine on more than 2 days a week (unless it is just for exercise).     · You need help figuring out what is triggering your asthma attacks. Where can you learn more? Go to http://arnav-may.info/. Enter P597 in the search box to learn more about \"Asthma in Adults: Care Instructions. \"  Current as of: December 6, 2017  Content Version: 11.7  © 7467-0577 [employer]. Care instructions adapted under license by [employer] (which disclaims liability or warranty for this information).  If you have questions about a medical condition or this instruction, always ask your healthcare professional. [place] [address] any warranty or liability for your use of this information.

## 2018-08-24 VITALS
HEIGHT: 58 IN | HEART RATE: 115 BPM | TEMPERATURE: 98 F | DIASTOLIC BLOOD PRESSURE: 73 MMHG | OXYGEN SATURATION: 95 % | BODY MASS INDEX: 24.1 KG/M2 | RESPIRATION RATE: 20 BRPM | SYSTOLIC BLOOD PRESSURE: 110 MMHG | WEIGHT: 114.8 LBS

## 2018-08-24 PROCEDURE — 94640 AIRWAY INHALATION TREATMENT: CPT

## 2018-08-24 PROCEDURE — 74011250636 HC RX REV CODE- 250/636: Performed by: HOSPITALIST

## 2018-08-24 PROCEDURE — 99218 HC RM OBSERVATION: CPT

## 2018-08-24 PROCEDURE — 74011250637 HC RX REV CODE- 250/637: Performed by: EMERGENCY MEDICINE

## 2018-08-24 PROCEDURE — 77030029684 HC NEB SM VOL KT MONA -A

## 2018-08-24 PROCEDURE — 74011250637 HC RX REV CODE- 250/637: Performed by: HOSPITALIST

## 2018-08-24 PROCEDURE — 74011000250 HC RX REV CODE- 250: Performed by: EMERGENCY MEDICINE

## 2018-08-24 RX ORDER — AZITHROMYCIN 250 MG/1
500 TABLET, FILM COATED ORAL DAILY
Status: DISCONTINUED | OUTPATIENT
Start: 2018-08-24 | End: 2018-08-24 | Stop reason: HOSPADM

## 2018-08-24 RX ORDER — METRONIDAZOLE 500 MG/1
500 TABLET ORAL EVERY 12 HOURS
COMMUNITY
Start: 2018-08-20 | End: 2018-08-27

## 2018-08-24 RX ORDER — AZITHROMYCIN 500 MG/1
500 TABLET, FILM COATED ORAL DAILY
Qty: 2 TAB | Refills: 0 | Status: SHIPPED | OUTPATIENT
Start: 2018-08-24 | End: 2018-08-26

## 2018-08-24 RX ORDER — METRONIDAZOLE 250 MG/1
500 TABLET ORAL 2 TIMES DAILY
Status: DISCONTINUED | OUTPATIENT
Start: 2018-08-24 | End: 2018-08-24 | Stop reason: HOSPADM

## 2018-08-24 RX ORDER — MONTELUKAST SODIUM 10 MG/1
10 TABLET ORAL
Qty: 30 TAB | Refills: 0 | Status: SHIPPED | OUTPATIENT
Start: 2018-08-24

## 2018-08-24 RX ORDER — MONTELUKAST SODIUM 10 MG/1
10 TABLET ORAL
Status: DISCONTINUED | OUTPATIENT
Start: 2018-08-24 | End: 2018-08-24 | Stop reason: HOSPADM

## 2018-08-24 RX ORDER — LORATADINE 10 MG/1
10 TABLET ORAL DAILY
COMMUNITY

## 2018-08-24 RX ORDER — PREDNISONE 10 MG/1
TABLET ORAL SEE ADMIN INSTRUCTIONS
COMMUNITY

## 2018-08-24 RX ORDER — ALBUTEROL SULFATE 90 UG/1
2 AEROSOL, METERED RESPIRATORY (INHALATION)
COMMUNITY
End: 2019-04-09

## 2018-08-24 RX ORDER — DIPHENHYDRAMINE HCL 25 MG
12.5-5 TABLET ORAL
COMMUNITY

## 2018-08-24 RX ORDER — ALBUTEROL SULFATE 0.83 MG/ML
2.5 SOLUTION RESPIRATORY (INHALATION)
COMMUNITY
End: 2019-04-09

## 2018-08-24 RX ADMIN — MONTELUKAST SODIUM 10 MG: 10 TABLET, FILM COATED ORAL at 01:09

## 2018-08-24 RX ADMIN — AZITHROMYCIN 500 MG: 250 TABLET, FILM COATED ORAL at 01:09

## 2018-08-24 RX ADMIN — METHYLPREDNISOLONE SODIUM SUCCINATE 40 MG: 40 INJECTION, POWDER, FOR SOLUTION INTRAMUSCULAR; INTRAVENOUS at 09:25

## 2018-08-24 RX ADMIN — IPRATROPIUM BROMIDE AND ALBUTEROL SULFATE 3 ML: .5; 3 SOLUTION RESPIRATORY (INHALATION) at 03:50

## 2018-08-24 RX ADMIN — Medication 10 ML: at 06:39

## 2018-08-24 RX ADMIN — METRONIDAZOLE 500 MG: 250 TABLET ORAL at 09:25

## 2018-08-24 RX ADMIN — Medication 10 ML: at 14:32

## 2018-08-24 RX ADMIN — METHYLPREDNISOLONE SODIUM SUCCINATE 40 MG: 40 INJECTION, POWDER, FOR SOLUTION INTRAMUSCULAR; INTRAVENOUS at 14:31

## 2018-08-24 RX ADMIN — IPRATROPIUM BROMIDE AND ALBUTEROL SULFATE 3 ML: .5; 3 SOLUTION RESPIRATORY (INHALATION) at 15:21

## 2018-08-24 RX ADMIN — IPRATROPIUM BROMIDE AND ALBUTEROL SULFATE 3 ML: .5; 3 SOLUTION RESPIRATORY (INHALATION) at 00:22

## 2018-08-24 RX ADMIN — IPRATROPIUM BROMIDE AND ALBUTEROL SULFATE 3 ML: .5; 3 SOLUTION RESPIRATORY (INHALATION) at 11:18

## 2018-08-24 RX ADMIN — IPRATROPIUM BROMIDE AND ALBUTEROL SULFATE 3 ML: .5; 3 SOLUTION RESPIRATORY (INHALATION) at 07:45

## 2018-08-24 NOTE — ED NOTES
TRANSFER - OUT REPORT:    Verbal report given to Emil GUTIÉRREZ(name) on Wilmar Mccullough  being transferred to Med/surg (unit) for routine progression of care       Report consisted of patients Situation, Background, Assessment and   Recommendations(SBAR). Information from the following report(s) SBAR, Kardex, ED Summary, Intake/Output, MAR, Recent Results and Med Rec Status was reviewed with the receiving nurse. Lines:   Peripheral IV 08/23/18 Right Hand (Active)   Site Assessment Clean, dry, & intact 8/23/2018  6:27 PM   Phlebitis Assessment 0 8/23/2018  6:27 PM   Infiltration Assessment 0 8/23/2018  6:27 PM   Dressing Status Clean, dry, & intact 8/23/2018  6:27 PM        Opportunity for questions and clarification was provided.       Patient transported with:   David do, [facility]

## 2018-08-24 NOTE — PROGRESS NOTES
1743) TRANSFER - IN REPORT:    Verbal report received from [facility], RN (name) on Mitzy Terrazas  being received from Emergency Room (unit) for routine progression of care      Report consisted of patients Situation, Background, Assessment and   Recommendations(SBAR). Information from the following report(s) SBAR, Kardex, ED Summary, Intake/Output, MAR, Recent Results and Med Rec Status was reviewed with the receiving nurse. Opportunity for questions and clarification was provided. Assessment completed upon patients arrival to unit and care assumed. [phone]  Arrived to the unit and vital signs obtained. Wheezing noted and Dr Kristan Mckeon consulted. Dr Kristan Mckeon discussed obtaining lab work with the patient and she refused due to cost.    7588) Bedside and Verbal shift change report given to Natalia Ireland RN (oncoming nurse) by Esther Sandy RN (offgoing nurse). Report included the following information SBAR, Kardex, ED Summary, MAR, Accordion, Recent Results and Med Rec Status.

## 2018-08-24 NOTE — H&P
Hospitalist Admission Note    NAME: Bria Hunter   :  2000   MRN:  [id number]     Date/Time:  2018 7:42 AM    Patient PCP: None  ______________________________________________________________________  Given the patient's current clinical presentation, I have a high level of concern for decompensation if discharged from the emergency department. Complex decision making was performed, which includes reviewing the patient's available past medical records, laboratory results, and x-ray films. My assessment of this patient's clinical condition and my plan of care is as follows. Assessment / Plan:    Mild persistent asthma with exacerbation  -pt refused blood work  -chest xray neg for acute pathology  -will change prednisone to methyl prednisone, cont duo nebs  -azithromycin  -peak flow    Bacterial vaginosis  -cont metronidazole      Code Status: full   Surrogate Decision Maker: mother    DVT Prophylaxis: lovenox  GI Prophylaxis: not indicated    Baseline: independent      Subjective:   CHIEF COMPLAINT: shortness of breath and wheezing    HISTORY OF PRESENT ILLNESS:     Bria Hunter is a 21 y.o. female with PMHx significant for asthma, presented to the ED with c/o above symptoms for 2 days. Says it started as cold like symptoms with cough, chills, and headache and has progressive wheezing and SOB. She tried numerous nebulizer treatments at home over the past 18 hours with no benefit. Pt was seen at Urgent Care where she received another treatment and prednisone, however was told that if she could not wait another 4 hours for a breathing treatment to go to the ED. Pt denies any fever, leg swelling, or calf pain or recent travel,  is not currently on OCP's and denies tobacco use. Of note, pt states she is currently taking Flagyl for BV. 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  She has refused labs as she is trying to avoid excessive healthcare cost.     We were asked to admit for work up and evaluation of the above problems. History reviewed. No pertinent past medical history. Past Surgical History:   Procedure Laterality Date    HX WISDOM TEETH EXTRACTION         Social History   Substance Use Topics    Smoking status: Never Smoker    Smokeless tobacco: Never Used    Alcohol use No        Family history, Mother has asthma, eczema and breast cancer  Allergies   Allergen Reactions    Sulfa (Sulfonamide Antibiotics) Hives        Prior to Admission medications    Medication Sig Start Date End Date Taking? Authorizing Provider   budesonide/formoterol fumarate (SYMBICORT IN) Take  by inhalation. Yes Phys Other, MD   loratadine (CLARITIN PO) Take  by mouth. Yes Phys Other, MD       REVIEW OF SYSTEMS:     I am not able to complete the review of systems because:    The patient is intubated and sedated    The patient has altered mental status due to his acute medical problems    The patient has baseline aphasia from prior stroke(s)    The patient has baseline dementia and is not reliable historian    The patient is in acute medical distress and unable to provide information           Total of 12 systems reviewed as follows:       POSITIVE= underlined text  Negative = text not underlined  General:  fever, chills, sweats, generalized weakness, weight loss/gain,      loss of appetite   Eyes:    blurred vision, eye pain, loss of vision, double vision  ENT:    rhinorrhea, pharyngitis   Respiratory:   cough, sputum production, SOB, YAÑEZ, wheezing, pleuritic pain   Cardiology:   chest pain, palpitations, orthopnea, PND, edema, syncope   Gastrointestinal:  abdominal pain , N/V, diarrhea, dysphagia, constipation, bleeding   Genitourinary:  frequency, urgency, dysuria, hematuria, incontinence   Muskuloskeletal :  arthralgia, myalgia, back pain  Hematology:  easy bruising, nose or gum bleeding, lymphadenopathy   Dermatological: rash, ulceration, pruritis, color change / jaundice  Endocrine:   hot flashes or polydipsia   Neurological:  headache, dizziness, confusion, focal weakness, paresthesia,     Speech difficulties, memory loss, gait difficulty  Psychological: Feelings of anxiety, depression, agitation    Objective:   VITALS:    Visit Vitals    /71 (BP 1 Location: Left arm, BP Patient Position: At rest)    Pulse 91    Temp 97.4 °F (36.3 °C)    Resp 24    Ht 4' 10\" (1.473 m)    Wt 52.1 kg (114 lb 12.8 oz)    SpO2 94%    BMI 23.99 kg/m2       PHYSICAL EXAM:    General:    Alert, cooperative, no distress, appears stated age. HEENT: Atraumatic, anicteric sclerae, pink conjunctivae     No oral ulcers, mucosa moist, throat clear, dentition fair  Neck:  Supple, symmetrical,  thyroid: non tender  Lungs:   B/l exp and wheeze  Chest wall:  No tenderness  No Accessory muscle use. Heart:   Regular  rhythm,  No  murmur   No edema  Abdomen:   Soft, non-tender. Not distended. Bowel sounds normal  Extremities: No cyanosis. No clubbing,      Skin turgor normal, Capillary refill normal, Radial dial pulse 2+  Skin:     Not pale. Not Jaundiced  No rashes   Psych:  Good insight. Not depressed. Not anxious or agitated. Neurologic: EOMs intact. No facial asymmetry. No aphasia or slurred speech. Symmetrical strength, Sensation grossly intact.  Alert and oriented X 4.     _______________________________________________________________________  Care Plan discussed with:    Comments   Patient x    Family      RN x    Care Manager                    Consultant:      _______________________________________________________________________  Expected  Disposition:   Home with Family x   HH/PT/OT/RN    SNF/LTC    BETTIE    ________________________________________________________________________  TOTAL TIME:  61 Minutes    Critical Care Provided     Minutes non procedure based      Comments     Reviewed previous records   >50% of visit spent in counseling and coordination of care  Discussion with patient and/or family and questions answered       ________________________________________________________________________  Signed: Jose Carlos Alvarez MD    Procedures: see electronic medical records for all procedures/Xrays and details which were not copied into this note but were reviewed prior to creation of Plan. LAB DATA REVIEWED:    No results found for this or any previous visit (from the past 24 hour(s)).

## 2018-08-24 NOTE — H&P
GENERAL GENERIC H&P/CONSULT    Subjective: asthma attack    21year old healthy female with 1st admit for asthma. She states it started with a cold a couple of weeks ago and has been waxing and waning since. She has been giving herself treatments repeatedly and got prednisone at Urgent care earlier today. She was told to go to the ED if she needed meds sooner than every 4 hours. As she continued to require repeated nebs, she came to [facility]. 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 She is exposed to smoke and has pet ferrets but does not personally smoke. History reviewed. No pertinent past medical history. Past Surgical History:   Procedure Laterality Date    HX WISDOM TEETH EXTRACTION        Prior to Admission medications    Medication Sig Start Date End Date Taking? Authorizing Provider   budesonide/formoterol fumarate (SYMBICORT IN) Take  by inhalation. Yes Phys Other, MD   loratadine (CLARITIN PO) Take  by mouth. Yes Phys Other, MD     Allergies   Allergen Reactions    Sulfa (Sulfonamide Antibiotics) Hives      Social History   Substance Use Topics    Smoking status: Never Smoker    Smokeless tobacco: Never Used    Alcohol use No      History reviewed. No pertinent family history. Review of Systems   Constitutional: Positive for fever. HENT: Positive for congestion and sinus pressure. Eyes: Negative. Respiratory: Positive for cough, chest tightness and wheezing. Cardiovascular: Negative. Gastrointestinal: Negative. Endocrine: Negative. Musculoskeletal: Negative. Allergic/Immunologic: Negative. Neurological: Negative. Hematological: Negative. Psychiatric/Behavioral: Negative.         Objective:          Patient Vitals for the past 8 hrs:   BP Temp Pulse Resp SpO2 Height Weight   08/23/18 2348 122/80 98.7 °F (37.1 °C) (!) 128 22 96 % - -   08/23/18 2247 127/73 98.5 °F (36.9 °C) (!) 126 22 97 % 4' 10\" (1.473 m) 52.1 kg (114 lb 12.8 oz)   08/23/18 2139 - - (!) 133 21 97 % - -   08/23/18 2021 - - - 22 97 % - -   08/23/18 2000 114/59 - (!) 143 21 99 % - -   08/23/18 1935 - - - - 96 % - -   08/23/18 1928 113/77 - - - - - -   08/23/18 1814 116/62 98.8 °F (37.1 °C) (!) 157 18 98 % - -   08/23/18 1643 - - - - 100 % - -     Physical Exam   Nursing note and vitals reviewed. Constitutional: She appears well-developed. No distress. HENT:   Head: Normocephalic. Eyes: Pupils are equal, round, and reactive to light. Neck: Normal range of motion. Cardiovascular: Regular rhythm. tachy   Pulmonary/Chest: She has wheezes. Bilateral inspiratory and expiratory wheezes   Abdominal: Soft. She exhibits no distension. Musculoskeletal: Normal range of motion. She exhibits no edema. Neurological: She is alert. She exhibits normal muscle tone. Skin: Skin is warm and dry. Psychiatric: She has a normal mood and affect. Labs:  Patient refused citing fear of needles and cost    INDICATION: . Chest Pain  Additional history: Wheezing x2 days. Nebulizer ineffective. COMPARISON: None. LIMITATIONS: Portable technique. Sarmad Mohan FINDINGS: Single frontal view of the chest.   .  Lines/tubes/surgical: Cardiac monitor leads overly the patient. Heart/mediastinum: Unremarkable. Lungs/pleura:  No focal consolidation or mass. No visualized pleural effusion or  pneumothorax. Additional Comments: None. Sarmad Mohan IMPRESSION  IMPRESSION:  1. No radiographic evidence of acute cardiopulmonary disease.          Assessment:  Principal Problem:    Asthma exacerbation (8/23/2018)        Plan: Nebs and steroids, adding Singulair and zithromax, should clear and then follow up with student health.   Discussed need for labs if she begins cramping which could be low potassium    Signed:  Matheus Bermudez MD 8/24/2018

## 2018-08-24 NOTE — PROGRESS NOTES
IDR: Treatment care for patient continue to monitor. Discuss patient inhalers and Peak flow. Possible discharge. Roxanna Smith [facility]. Reason for Admission:   Asthma Exacerbation             RRAT Score:   4        Plan for utilizing home health:     Not at this time                Likelihood of Readmission:    Not at this                  Transition of Care Plan:      CM review chart. Patient verify demographic.  21 y.o. female with PMHx significant for asthma. Patient is a student at [employer]. Patient PCP located in [place]. Patient sees Marjorie Bonds at school. Patient need to follow-up with Marjorie Bonds at school. Resources added to AVS if patient need assistance. Patient would like nmedication to go to [facility] on [address]. Patient will need an excuse note for classes. Roxanna mSith [facility] [place].

## 2018-08-24 NOTE — PROGRESS NOTES
Pharmacy Medication Reconciliation     The patient was interviewed regarding current PTA medication list, use and drug allergies; A visitor was present in room and obtained permission from patient to discuss drug regimen with visitor present. The patient was questioned regarding use of any other inhalers, topical products, over the counter medications, herbal medications, vitamin products or ophthalmic/nasal/otic medication use. Allergy Update: Sulfa-Anitbiotics (hives/rash), Animal Dander (Itching/Sneeze), Grass Pollen (Itching/sneeze), Nuts (all except almonds)    Recommendations/Findings: The following amendments were made to the patient's active medication list on file at [facility]:     1) Additions:       - Albuterol inhaler added      - Benadryl      - Metronidazole      - Prednisone (has not started yet)    2) Deletions: No deletions made    3) Changes:      - Added loratadine dose    4.) Additional Information     - Unable to contact Dr. Jovany Valenzuela at [facility] (office closed) to clarify Symbicort strength, patient states that she uses samples that were given to her, but does not remember the strength.      -Clarified PTA med list with patient. PTA medication list was corrected to the following:   Prior to Admission Medications   Prescriptions Last Dose Informant Patient Reported? Taking? albuterol (PROVENTIL VENTOLIN) 2.5 mg /3 mL (0.083 %) nebulizer solution 2018 at Unknown time Self Yes Yes   Si.5 mg by Nebulization route every four (4) hours as needed for Wheezing or Shortness of Breath (if no relief from albuterol inhaler). Indications: Acute Asthma Attack   albuterol (VENTOLIN HFA) 90 mcg/actuation inhaler 2018 at Unknown time Self Yes Yes   Sig: Take 2 Puffs by inhalation every four (4) hours as needed for Wheezing or Shortness of Breath. Indications: Acute Asthma Attack   budesonide/formoterol fumarate (SYMBICORT IN)  Self Yes Yes   Sig: Take 1 Puff by inhalation two (2) times a day. diphenhydrAMINE (BENADRYL) 25 mg tablet  Self Yes Yes   Sig: Take 12.5-50 mg by mouth nightly as needed for Sleep (allergies). loratadine (CLARITIN) 10 mg tablet 8/23/2018 at Unknown time Self Yes Yes   Sig: Take 10 mg by mouth daily. metroNIDAZOLE (FLAGYL) 500 mg tablet 8/23/2018 at Unknown time Self Yes Yes   Sig: Take 500 mg by mouth every twelve (12) hours. For 7 days starting 8/20/18   predniSONE (STERAPRED DS) 10 mg dose pack Not started yet Self Yes Yes   Sig: Take  by mouth See Admin Instructions.  See administration instruction per 10mg dose pack      Facility-Administered Medications: None        Thank you,  Paulina Jerry, PharmD Candidate [id number]

## 2018-08-24 NOTE — PROGRESS NOTES
1700 Mews score 3 due to .pt receiving  breathing treatment. 1800 pt ambulated in the hallway. no SOB noticed. pt stated she can go home. [phone] pt discharged to home. discharge instruction reviewed with pt.prescription medicine filled in [facility] pt pharmacy. pt received written instruction regarding her prescription medicine with verbal feedback. care notes done. iv line removed. pt awake,alert with steady gait. pt has all her belonging with her. pt left the building with her boyfriend.

## 2018-08-27 ENCOUNTER — TELEPHONE (OUTPATIENT)
Dept: CASE MANAGEMENT | Age: 18
End: 2018-08-27

## 2018-08-27 NOTE — TELEPHONE ENCOUNTER
Care Management follow-up call    CM called pt to discuss discharge plan. Pt did not answer the phone, CM left message for pt to call back. PCP follow-up on 9/4/18 at 2:15 pm with [facility]. Follow-up with Dr. Nelly Samuel on 8/28/18 at 2:45pm. Pt needs  information to follow-up with OB/GYN at [facility].        [facility] MSW, QMHP

## 2019-04-09 ENCOUNTER — APPOINTMENT (OUTPATIENT)
Dept: GENERAL RADIOLOGY | Age: 19
End: 2019-04-09
Attending: PHYSICIAN ASSISTANT
Payer: COMMERCIAL

## 2019-04-09 ENCOUNTER — HOSPITAL ENCOUNTER (EMERGENCY)
Age: 19
Discharge: HOME | End: 2019-04-09
Attending: EMERGENCY MEDICINE | Admitting: EMERGENCY MEDICINE
Payer: COMMERCIAL

## 2019-04-09 VITALS
RESPIRATION RATE: 18 BRPM | DIASTOLIC BLOOD PRESSURE: 89 MMHG | SYSTOLIC BLOOD PRESSURE: 134 MMHG | TEMPERATURE: 98.2 F | HEART RATE: 103 BPM | OXYGEN SATURATION: 97 % | HEIGHT: 59 IN | WEIGHT: 103 LBS | BODY MASS INDEX: 20.76 KG/M2

## 2019-04-09 DIAGNOSIS — J45.41 MODERATE PERSISTENT ASTHMA WITH ACUTE EXACERBATION: Primary | ICD-10-CM

## 2019-04-09 PROCEDURE — 99282 EMERGENCY DEPT VISIT SF MDM: CPT

## 2019-04-09 PROCEDURE — 94640 AIRWAY INHALATION TREATMENT: CPT

## 2019-04-09 PROCEDURE — 77030029684 HC NEB SM VOL KT MONA -A

## 2019-04-09 PROCEDURE — 74011000250 HC RX REV CODE- 250: Performed by: PHYSICIAN ASSISTANT

## 2019-04-09 PROCEDURE — 71046 X-RAY EXAM CHEST 2 VIEWS: CPT

## 2019-04-09 RX ORDER — IPRATROPIUM BROMIDE AND ALBUTEROL SULFATE 2.5; .5 MG/3ML; MG/3ML
3 SOLUTION RESPIRATORY (INHALATION)
Status: COMPLETED | OUTPATIENT
Start: 2019-04-09 | End: 2019-04-09

## 2019-04-09 RX ORDER — DOXYCYCLINE 100 MG/1
100 TABLET ORAL 2 TIMES DAILY
COMMUNITY

## 2019-04-09 RX ORDER — BENZONATATE 100 MG/1
100 CAPSULE ORAL
COMMUNITY

## 2019-04-09 RX ADMIN — IPRATROPIUM BROMIDE AND ALBUTEROL SULFATE 3 ML: .5; 3 SOLUTION RESPIRATORY (INHALATION) at 15:03

## 2019-04-09 RX ADMIN — IPRATROPIUM BROMIDE AND ALBUTEROL SULFATE 3 ML: .5; 3 SOLUTION RESPIRATORY (INHALATION) at 13:57

## 2019-04-09 NOTE — DISCHARGE INSTRUCTIONS

## 2019-04-09 NOTE — LETTER
[facility] EMERGENCY DEPT 
[address] [address] 
[phone] Work/School Note Date: 4/9/2019 To Whom It May concern: 
 
Kevin Stewart was seen and treated today in the emergency room by the following provider(s): 
Attending Provider: Daljit Magana MD 
Physician Assistant: LUCA Victor. Kevin Stewart may return to school on 4/11/2019. Sincerely, LUCA Briggs

## 2019-04-09 NOTE — ED NOTES
Emergency Department Nursing Plan of Care       The Nursing Plan of Care is developed from the Nursing assessment and Emergency Department Attending provider initial evaluation. The plan of care may be reviewed in the ED Provider note.     The Plan of Care was developed with the following considerations:   Patient / Family readiness to learn indicated by:verbalized understanding  Persons(s) to be included in education: patient  Barriers to Learning/Limitations:No    Signed     Estrella Welch RN    4/9/2019   1:54 PM

## 2019-04-09 NOTE — LETTER
[facility] EMERGENCY DEPT 
[address] [address] 
[phone] Work/School Note Date: 4/9/2019 To Whom It May concern: 
 
Wilmar Mccullough was seen and treated today in the emergency room by the following provider(s): 
Attending Provider: Lucina Rome MD 
Physician Assistant: LUCA Guerrero. Wilmar Mccullough may return to work on 4/11/2019. Sincerely, LUCA Gerard

## 2019-04-09 NOTE — ED PROVIDER NOTES
EMERGENCY DEPARTMENT HISTORY AND PHYSICAL EXAM      Date: 4/9/2019  Patient Name: Briana Howell    History of Presenting Illness     Chief Complaint   Patient presents with    Cough       History Provided By: Patient    HPI: Briana Howell, 19 y.o. female with PMHx significant for asthma, presents ambulatory to the ED with cc of cough and congestion with increased wheezing over the past 2 days. Patient states she was seen in urgent care 2 days ago and prescribed prednisone. Patient states she has been using inhaler and nebulizers at home without relief. Denies fever/chills, sore throat, ear pain. Reports symptoms are not worse at night. Denies shortness of breath. There are no other complaints, changes, or physical findings at this time. PCP: None    No current facility-administered medications on file prior to encounter. Current Outpatient Medications on File Prior to Encounter   Medication Sig Dispense Refill    doxycycline (ADOXA) 100 mg tablet Take 100 mg by mouth two (2) times a day.  benzonatate (TESSALON PERLES) 100 mg capsule Take 100 mg by mouth three (3) times daily as needed for Cough.  predniSONE (STERAPRED DS) 10 mg dose pack Take  by mouth See Admin Instructions. See administration instruction per 10mg dose pack      loratadine (CLARITIN) 10 mg tablet Take 10 mg by mouth daily.  diphenhydrAMINE (BENADRYL) 25 mg tablet Take 12.5-50 mg by mouth nightly as needed for Sleep (allergies).  montelukast (SINGULAIR) 10 mg tablet Take 1 Tab by mouth nightly. 30 Tab 0    budesonide/formoterol fumarate (SYMBICORT IN) Take 1 Puff by inhalation two (2) times a day. Past History     Past Medical History:  History reviewed. No pertinent past medical history. Past Surgical History:  Past Surgical History:   Procedure Laterality Date    HX WISDOM TEETH EXTRACTION         Family History:  History reviewed. No pertinent family history.     Social History:  Social History Tobacco Use    Smoking status: Never Smoker    Smokeless tobacco: Never Used   Substance Use Topics    Alcohol use: No    Drug use: No       Allergies: Allergies   Allergen Reactions    Sulfa (Sulfonamide Antibiotics) Hives    Animal Dander Itching and Sneezing    Grass Pollen Itching and Sneezing    Nut - Unspecified Nausea and Vomiting and Swelling     All nuts except almonds         Review of Systems   Review of Systems   Constitutional: Negative for chills and fever. Respiratory: Positive for chest tightness and shortness of breath. Negative for apnea and cough. Cardiovascular: Negative for chest pain. Gastrointestinal: Negative for abdominal pain, nausea and vomiting. Genitourinary: Negative for flank pain. Musculoskeletal: Negative for back pain and myalgias. Skin: Negative for color change, pallor, rash and wound. Neurological: Negative for dizziness, weakness and light-headedness. All other systems reviewed and are negative. Physical Exam   Physical Exam   Constitutional: She is oriented to person, place, and time. She appears well-developed and well-nourished. No distress. Not working to breathe  NAD   HENT:   Head: Normocephalic and atraumatic. Eyes: Conjunctivae are normal.   Cardiovascular: Normal rate, regular rhythm and normal heart sounds. Pulmonary/Chest: Effort normal and breath sounds normal. No respiratory distress. Expiratory wheezing and rhonchi in all lung fields   Abdominal: Soft. Bowel sounds are normal. She exhibits no distension. Musculoskeletal: Normal range of motion. Neurological: She is alert and oriented to person, place, and time. Skin: Skin is warm. No rash noted. Psychiatric: She has a normal mood and affect. Her behavior is normal.   Nursing note and vitals reviewed. Diagnostic Study Results     Labs -   No results found for this or any previous visit (from the past 12 hour(s)).     Radiologic Studies -   XR CHEST PA LAT Final Result   IMPRESSION: No acute process           CT Results  (Last 48 hours)    None        CXR Results  (Last 48 hours)               04/09/19 1434  XR CHEST PA LAT Final result    Impression:  IMPRESSION: No acute process           Narrative:  INDICATION:  wheezing + cough        COMPARISON: August 2018       FINDINGS: PA and lateral views of the chest demonstrate a stable   cardiomediastinal silhouette and clear lungs bilaterally. The visualized osseous   structures are unremarkable. Medical Decision Making   I am the first provider for this patient. I reviewed the vital signs, available nursing notes, past medical history, past surgical history, family history and social history. Vital Signs-Reviewed the patient's vital signs. Patient Vitals for the past 12 hrs:   Temp Pulse Resp BP SpO2   04/09/19 1634 -- (!) 103 -- -- 97 %   04/09/19 1504 -- -- -- -- 96 %   04/09/19 1359 -- -- -- -- 97 %   04/09/19 1358 -- -- -- -- 97 %   04/09/19 1318 98.2 °F (36.8 °C) 100 18 134/89 97 %       Pulse Oximetry Analysis - 97% on RA    Records Reviewed: Nursing Notes and Old Medical Records    Provider Notes (Medical Decision Making):   DDx: Asthma exacerbation, URI, Bronchitis, PNA    ED Course:   Initial assessment performed. The patients presenting problems have been discussed, and they are in agreement with the care plan formulated and outlined with them. I have encouraged them to ask questions as they arise throughout their visit. 2:55 PM  Lungs reassessed. Wheezing still present in all lung fields. Pt reports improvement of sx and denies SOB. Third duoneb ordered. 3:30 PM  Lungs reassessed. Wheezing improved but still present in all lung fields. Long conversation with patient regarding diagnosis, symptoms and treatment course. Patient states symptoms are improved and she feels safe to go home. Discussed returning if symptoms worsen or do not improve.   All questions answered and patient voiced understood. SaO2: 97% on discharge. Disposition:  Discussed imaging results with pt along with dx and treatment plan. Discussed importance of PCP follow up. All questions answered. Pt voiced they understood. Return if sx worsen. PLAN:  1. Discharge Medication List as of 2019  4:37 PM      CONTINUE these medications which have NOT CHANGED    Details   doxycycline (ADOXA) 100 mg tablet Take 100 mg by mouth two (2) times a day., Historical Med      benzonatate (TESSALON PERLES) 100 mg capsule Take 100 mg by mouth three (3) times daily as needed for Cough., Historical Med      predniSONE (STERAPRED DS) 10 mg dose pack Take  by mouth See Admin Instructions. See administration instruction per 10mg dose pack, Historical Med      loratadine (CLARITIN) 10 mg tablet Take 10 mg by mouth daily. , Historical Med      diphenhydrAMINE (BENADRYL) 25 mg tablet Take 12.5-50 mg by mouth nightly as needed for Sleep (allergies). , Historical Med      montelukast (SINGULAIR) 10 mg tablet Take 1 Tab by mouth nightly., Normal, Disp-30 Tab, R-0      budesonide/formoterol fumarate (SYMBICORT IN) Take 1 Puff by inhalation two (2) times a day., Historical Med         STOP taking these medications       albuterol (VENTOLIN HFA) 90 mcg/actuation inhaler Comments:   Reason for Stopping:         albuterol (PROVENTIL VENTOLIN) 2.5 mg /3 mL (0.083 %) nebulizer solution Comments:   Reason for Stoppin.   Follow-up Information     Follow up With Specialties Details Why Contact Info    [facility]  Schedule an appointment as soon as possible for a visit As needed [address] [facility], [address] [place] [id number] [phone]    [facility] EMERGENCY DEPT Emergency Medicine  As needed, If symptoms worsen [place]  [phone]        Return to ED if worse     Diagnosis     Clinical Impression:   1. Moderate persistent asthma with acute exacerbation

## 2019-04-09 NOTE — ED NOTES
Nurse practitioner earlier and give report regarding this pt,pt seen urgent care x 2 days ago,on prednisone,pt taking 60 mg prednisone since x 2 days,but not feeling any better,continue wheezing,pt seen here before so she sent her here,pt coming with her friend.